# Patient Record
(demographics unavailable — no encounter records)

---

## 2025-03-20 NOTE — HEALTH RISK ASSESSMENT
[No] : In the past 12 months have you used drugs other than those required for medical reasons? No [0] : 2) Feeling down, depressed, or hopeless: Not at all (0) [PHQ-2 Negative - No further assessment needed] : PHQ-2 Negative - No further assessment needed [Never] : Never [Audit-CScore] : 0 [[id number]] : 0

## 2025-03-20 NOTE — HISTORY OF PRESENT ILLNESS
[FreeTextEntry1] : el tsh, paresthesias, chest tightness, sz hx [de-identified] : 63 y/o female with hx sz from the flu vaccine paresthesias, chest tightness, el TSH here for f/u Has been having lt anterior cervical swelling with tenderness and occ pain.  no fevers.  + occ sore throat.  no URI sx.  Neg covid testing. sono: Mildly enlarged and heterogeneous thyroid gland. There are no suspicious lesions.  Heterogeneity of the left submandibular gland suggestive of sialadenitis.  Still has been bothering her.    Has seen ENT - was sent to laryngologist. Has been doing voice training.  Has been okay.  to f/u. Has seen the endocrinologist. Has been taking the thyroid rx. Sono with no nodules, mild enlargement.  Had been taking the synthroid.  Currently not taking - lost the refill.  Has been having some foot and leg pain at the end of the day. Has been persisting. Interested in trying compression. Improved. Still some numbness at the foot. Had appt with neurology in Oct.  Has been okay.   Chest tightness has been better. had seen cardiology.  no sx recurrence.  Has been exercising.  With occ palpitations.  no triggers.  No noted relieving or exacerbating factors.  No sx with exercise.   Headaches have been improved.  Has been having rt thumb and lt shoulder pain for months.    Vaccines - tet < 10 flu - had sz when getting. permanently deferred.  Had covid vaccine x2.  gyn - 12/22 mammo - 1/24 - to get result sent colon - '18 - told to f/u in 10

## 2025-03-20 NOTE — HISTORY OF PRESENT ILLNESS
[FreeTextEntry1] : el tsh, paresthesias, chest tightness, sz hx [de-identified] : 63 y/o female with hx sz from the flu vaccine paresthesias, chest tightness, el TSH here for f/u Has been having lt anterior cervical swelling with tenderness and occ pain.  no fevers.  + occ sore throat.  no URI sx.  Neg covid testing. sono: Mildly enlarged and heterogeneous thyroid gland. There are no suspicious lesions.  Heterogeneity of the left submandibular gland suggestive of sialadenitis.  Still has been bothering her.    Has seen ENT - was sent to laryngologist. Has been doing voice training.  Has been okay.  to f/u. Has seen the endocrinologist. Has been taking the thyroid rx. Sono with no nodules, mild enlargement.  Had been taking the synthroid.  Currently not taking - lost the refill.  Has been having some foot and leg pain at the end of the day. Has been persisting. Interested in trying compression. Improved. Still some numbness at the foot. Had appt with neurology in Oct.  Has been okay.   Chest tightness has been better. had seen cardiology.  no sx recurrence.  Has been exercising.  With occ palpitations.  no triggers.  No noted relieving or exacerbating factors.  No sx with exercise.   Headaches have been improved.  Has been having rt thumb and lt shoulder pain for months.    Vaccines - tet < 10 flu - had sz when getting. permanently deferred.  Had covid vaccine x2.  gyn - 12/22 mammo - 1/24 - to get result sent colon - '18 - told to f/u in 10

## 2025-03-20 NOTE — PHYSICAL EXAM
[No Acute Distress] : no acute distress [Well Nourished] : well nourished [Well Developed] : well developed [Well-Appearing] : well-appearing [Normal Voice/Communication] : normal voice/communication [Normal Sclera/Conjunctiva] : normal sclera/conjunctiva [PERRL] : pupils equal round and reactive to light [EOMI] : extraocular movements intact [Normal Outer Ear/Nose] : the outer ears and nose were normal in appearance [Normal Oropharynx] : the oropharynx was normal [No JVD] : no jugular venous distention [Supple] : supple [No Respiratory Distress] : no respiratory distress  [No Accessory Muscle Use] : no accessory muscle use [Clear to Auscultation] : lungs were clear to auscultation bilaterally [Normal Rate] : normal rate  [Regular Rhythm] : with a regular rhythm [Normal S1, S2] : normal S1 and S2 [No Murmur] : no murmur heard [No Carotid Bruits] : no carotid bruits [No Abdominal Bruit] : a ~M bruit was not heard ~T in the abdomen [Pedal Pulses Present] : the pedal pulses are present [No Edema] : there was no peripheral edema [No Palpable Aorta] : no palpable aorta [Soft] : abdomen soft [Non Tender] : non-tender [Non-distended] : non-distended [No Masses] : no abdominal mass palpated [No HSM] : no HSM [Normal Bowel Sounds] : normal bowel sounds [Normal Posterior Cervical Nodes] : no posterior cervical lymphadenopathy [No CVA Tenderness] : no CVA  tenderness [No Spinal Tenderness] : no spinal tenderness [No Joint Swelling] : no joint swelling [Grossly Normal Strength/Tone] : grossly normal strength/tone [Coordination Grossly Intact] : coordination grossly intact [No Focal Deficits] : no focal deficits [Normal Gait] : normal gait [Speech Grossly Normal] : speech grossly normal [Memory Grossly Normal] : memory grossly normal [Normal Affect] : the affect was normal [Alert and Oriented x3] : oriented to person, place, and time [Normal Mood] : the mood was normal [Normal Insight/Judgement] : insight and judgment were intact [No Lymphadenopathy] : no lymphadenopathy [Normal Anterior Cervical Nodes] : no anterior cervical lymphadenopathy [de-identified] : mild tenderness at the thenar eminence.  + some pain when stretching the thumb awafrom palmar abduction.  FROM. No tenderness and FROM at the shoulder.  No pain pushing against resistance.  No pain with internal/external rotation against resistance.

## 2025-03-20 NOTE — REVIEW OF SYSTEMS
[Fatigue] : fatigue [Hot Flashes] : hot flashes [Night Sweats] : night sweats [Earache] : earache [Nasal Discharge] : nasal discharge [Muscle Pain] : muscle pain [Negative] : Heme/Lymph [Chest Pain] : no chest pain [Palpitations] : no palpitations [Leg Claudication] : no leg claudication [Lower Ext Edema] : no lower extremity edema [Orthopnea] : no orthopnea [Paroxysmal Nocturnal Dyspnea] : no paroxysmal nocturnal dyspnea [Joint Pain] : no joint pain [Joint Stiffness] : no joint stiffness [Joint Swelling] : no joint swelling [Muscle Weakness] : no muscle weakness [Back Pain] : no back pain [FreeTextEntry4] : per HPI [FreeTextEntry9] : body aches

## 2025-03-20 NOTE — PHYSICAL EXAM
[No Acute Distress] : no acute distress [Well Nourished] : well nourished [Well Developed] : well developed [Well-Appearing] : well-appearing [Normal Voice/Communication] : normal voice/communication [Normal Sclera/Conjunctiva] : normal sclera/conjunctiva [PERRL] : pupils equal round and reactive to light [EOMI] : extraocular movements intact [Normal Outer Ear/Nose] : the outer ears and nose were normal in appearance [Normal Oropharynx] : the oropharynx was normal [No JVD] : no jugular venous distention [Supple] : supple [No Respiratory Distress] : no respiratory distress  [No Accessory Muscle Use] : no accessory muscle use [Clear to Auscultation] : lungs were clear to auscultation bilaterally [Normal Rate] : normal rate  [Regular Rhythm] : with a regular rhythm [Normal S1, S2] : normal S1 and S2 [No Murmur] : no murmur heard [No Carotid Bruits] : no carotid bruits [No Abdominal Bruit] : a ~M bruit was not heard ~T in the abdomen [Pedal Pulses Present] : the pedal pulses are present [No Edema] : there was no peripheral edema [No Palpable Aorta] : no palpable aorta [Soft] : abdomen soft [Non Tender] : non-tender [Non-distended] : non-distended [No Masses] : no abdominal mass palpated [No HSM] : no HSM [Normal Bowel Sounds] : normal bowel sounds [Normal Posterior Cervical Nodes] : no posterior cervical lymphadenopathy [No CVA Tenderness] : no CVA  tenderness [No Spinal Tenderness] : no spinal tenderness [No Joint Swelling] : no joint swelling [Grossly Normal Strength/Tone] : grossly normal strength/tone [Coordination Grossly Intact] : coordination grossly intact [No Focal Deficits] : no focal deficits [Normal Gait] : normal gait [Speech Grossly Normal] : speech grossly normal [Memory Grossly Normal] : memory grossly normal [Normal Affect] : the affect was normal [Alert and Oriented x3] : oriented to person, place, and time [Normal Mood] : the mood was normal [Normal Insight/Judgement] : insight and judgment were intact [No Lymphadenopathy] : no lymphadenopathy [Normal Anterior Cervical Nodes] : no anterior cervical lymphadenopathy [de-identified] : mild tenderness at the thenar eminence.  + some pain when stretching the thumb awafrom palmar abduction.  FROM. No tenderness and FROM at the shoulder.  No pain pushing against resistance.  No pain with internal/external rotation against resistance.

## 2025-03-20 NOTE — HEALTH RISK ASSESSMENT
[No] : In the past 12 months have you used drugs other than those required for medical reasons? No [0] : 2) Feeling down, depressed, or hopeless: Not at all (0) [PHQ-2 Negative - No further assessment needed] : PHQ-2 Negative - No further assessment needed [Never] : Never [Audit-CScore] : 0 [[id number]] : 0

## 2025-03-28 NOTE — END OF VISIT
[FreeTextEntry3] : I, Dr. Vidal personally performed the evaluation and management (E/M) services , including all procedures, for this established patient who presents today with (a) new problem(s)/exacerbation of (an) existing condition(s). That E/M includes conducting the clinically appropriate interval history &/or exam, assessing all new/exacerbated conditions, and establishing a new plan of care. Today, my UNIQUE, Sintia Duran, was here to observe &/or participate in the visit & follow plan of care established by me.

## 2025-03-28 NOTE — HISTORY OF PRESENT ILLNESS
[de-identified] : Patient was seen about 2 years ago for the same issues: again feeling like when singing or speaking she has pain in the right side of the neck and throat. The pain feels like a tightness. Makes it hard to sing  She is not having any issues with eating, drinking or swallowing  She was seen about 2 years ago with the same complaint and she was found to have a sluggish left vocal cord. She was referred to Dr Houser who saw nothing at the time and put her on reflux meds. She took this but cannot recall at that time if it helped  No nasal issues or ear issues

## 2025-03-28 NOTE — ASSESSMENT
[FreeTextEntry1] : Patient follows up has similar symptoms that she did in the first time we saw her 2 years ago where she had a vocal cord paresis that apparently got better on its own she was treated with reflux at the time and placed on nasal sprays once again she is noticing similar symptoms again fiberoptically there is evidence of reflux in her arytenoids and a little bit of paresis of her left vocal cord once again put her on a Medrol dose pack this time and told her to restart her reflux medication and follow-up and see us in 2 months.

## 2025-03-28 NOTE — REASON FOR VISIT
[Subsequent Evaluation] : a subsequent evaluation for [FreeTextEntry2] :  hoarseness and throat issues

## 2025-06-19 NOTE — ASSESSMENT
[FreeTextEntry1] : Patient follows up voice improved fiberoptic evaluation the larynx shows vocal cord mobile mobile motility has improved both cords are moving she will continue on her reflux medication continue on Flonase follow-up and see us in 3 months.

## 2025-06-19 NOTE — END OF VISIT
[FreeTextEntry3] : I personally saw and examined VELVET SOUSA in detail this visit today. I personally reviewed the HPI, PMH, FH, SH, ROS, and medications/allergies. I have spoken to TEJINDER Patel regarding the history and have personally determined the assessment and plan of care and documented this myself. I was present and participated in all key portions of the encounter and all procedures noted above. I have made changes in the body of the note where appropriate.

## 2025-06-19 NOTE — HISTORY OF PRESENT ILLNESS
[de-identified] : Patient seen in March with recurrent hoarseness. She was found to have reflux and vocal cord paresis. She was given some reflux meds, nasal spray and a steroid pack and now reports improvement. She has stayed on the reflux meds since March. She does not have any pain in the throat, issues eating drinking or swallowing

## 2025-07-02 NOTE — PHYSICAL EXAM
[No Acute Distress] : no acute distress [Well Nourished] : well nourished [Well Developed] : well developed [Well-Appearing] : well-appearing [Normal Voice/Communication] : normal voice/communication [Normal Sclera/Conjunctiva] : normal sclera/conjunctiva [PERRL] : pupils equal round and reactive to light [EOMI] : extraocular movements intact [Normal Outer Ear/Nose] : the outer ears and nose were normal in appearance [Normal Oropharynx] : the oropharynx was normal [No JVD] : no jugular venous distention [No Lymphadenopathy] : no lymphadenopathy [Supple] : supple [No Respiratory Distress] : no respiratory distress  [No Accessory Muscle Use] : no accessory muscle use [Clear to Auscultation] : lungs were clear to auscultation bilaterally [Normal Rate] : normal rate  [Regular Rhythm] : with a regular rhythm [Normal S1, S2] : normal S1 and S2 [No Murmur] : no murmur heard [No Carotid Bruits] : no carotid bruits [No Abdominal Bruit] : a ~M bruit was not heard ~T in the abdomen [Pedal Pulses Present] : the pedal pulses are present [No Edema] : there was no peripheral edema [No Palpable Aorta] : no palpable aorta [Soft] : abdomen soft [Non Tender] : non-tender [Non-distended] : non-distended [No Masses] : no abdominal mass palpated [No HSM] : no HSM [Normal Bowel Sounds] : normal bowel sounds [Normal Posterior Cervical Nodes] : no posterior cervical lymphadenopathy [Normal Anterior Cervical Nodes] : no anterior cervical lymphadenopathy [No CVA Tenderness] : no CVA  tenderness [No Spinal Tenderness] : no spinal tenderness [No Joint Swelling] : no joint swelling [Grossly Normal Strength/Tone] : grossly normal strength/tone [Coordination Grossly Intact] : coordination grossly intact [No Focal Deficits] : no focal deficits [Normal Gait] : normal gait [Speech Grossly Normal] : speech grossly normal [Memory Grossly Normal] : memory grossly normal [Normal Affect] : the affect was normal [Alert and Oriented x3] : oriented to person, place, and time [Normal Mood] : the mood was normal [Normal Insight/Judgement] : insight and judgment were intact

## 2025-07-02 NOTE — HISTORY OF PRESENT ILLNESS
[FreeTextEntry1] : el tsh, paresthesias, chest tightness, sz hx, gerd [de-identified] : 62 y/o female with hx sz from the flu vaccine paresthesias, chest tightness, el TSH here for f/u Has been having lt anterior cervical swelling with tenderness and occ pain.  no fevers.  + occ sore throat.  no URI sx.  Neg covid testing. sono: Mildly enlarged and heterogeneous thyroid gland. There are no suspicious lesions.  Heterogeneity of the left submandibular gland suggestive of sialadenitis.  Not bothering her.    Has seen ENT - was sent to laryngologist. Has been doing voice training.  Had been okay.  Symptoms were bothering her again.  She saw ENT for follow-up.  Given Medrol pack, Flonase, PPI for sluggish vocal cord and evidence of GERD.  Had improvement of the symptoms on follow-up with ENT.  Patient continues on the PPI. Has seen the endocrinologist. Has been taking the thyroid rx. Sono with no nodules, mild enlargement.  Had been taking the synthroid.  Currently not taking. Has been having some foot and leg pain at the end of the day. Has been persisting. Interested in trying compression. Improved. Still some numbness at the foot. Had appt with neurology in Oct.  Has been okay.   Chest tightness has been better. had seen cardiology.  no sx recurrence.  Has been exercising.  With occ palpitations.  no triggers.  No noted relieving or exacerbating factors.  No sx with exercise.  Had called to establish with cardiology - ? wrong #.  Has been better.   Headaches have been improved.  Has been having rt thumb and lt shoulder pain.  Has been better. Has been having hemorrhoidal flare.  Some improvement with OTC rx.    Has retired.  Vaccines - tet < 10 flu - had sz when getting. permanently deferred.  Had covid vaccine x2.  gyn - Has followed up mammo - 4/25 colon - '18 - told to f/u in 10

## 2025-07-02 NOTE — HEALTH RISK ASSESSMENT
[No] : In the past 12 months have you used drugs other than those required for medical reasons? No [0] : 2) Feeling down, depressed, or hopeless: Not at all (0) [PHQ-2 Negative - No further assessment needed] : PHQ-2 Negative - No further assessment needed [Never] : Never [Patient reported mammogram was normal] : Patient reported mammogram was normal [MammogramDate] : 4/25 [Audit-CScore] : 0 [[id number]] : 0

## 2025-07-21 NOTE — REVIEW OF SYSTEMS
[Hot Flashes] : hot flashes [Muscle Pain] : muscle pain [Negative] : Heme/Lymph [Sore Throat] : sore throat [Cough] : cough [Dizziness] : dizziness

## 2025-07-21 NOTE — PHYSICAL EXAM
[No Acute Distress] : no acute distress [Well Nourished] : well nourished [Well Developed] : well developed [Well-Appearing] : well-appearing [Normal Voice/Communication] : normal voice/communication [Normal Sclera/Conjunctiva] : normal sclera/conjunctiva [PERRL] : pupils equal round and reactive to light [EOMI] : extraocular movements intact [Normal Outer Ear/Nose] : the outer ears and nose were normal in appearance [Normal Oropharynx] : the oropharynx was normal [No JVD] : no jugular venous distention [No Lymphadenopathy] : no lymphadenopathy [Supple] : supple [No Respiratory Distress] : no respiratory distress  [No Accessory Muscle Use] : no accessory muscle use [Normal Rate] : normal rate  [Regular Rhythm] : with a regular rhythm [Normal S1, S2] : normal S1 and S2 [No Murmur] : no murmur heard [No Carotid Bruits] : no carotid bruits [No Abdominal Bruit] : a ~M bruit was not heard ~T in the abdomen [Pedal Pulses Present] : the pedal pulses are present [No Edema] : there was no peripheral edema [No Palpable Aorta] : no palpable aorta [Soft] : abdomen soft [Non Tender] : non-tender [Non-distended] : non-distended [No Masses] : no abdominal mass palpated [No HSM] : no HSM [Normal Bowel Sounds] : normal bowel sounds [Normal Posterior Cervical Nodes] : no posterior cervical lymphadenopathy [Normal Anterior Cervical Nodes] : no anterior cervical lymphadenopathy [No CVA Tenderness] : no CVA  tenderness [No Spinal Tenderness] : no spinal tenderness [No Joint Swelling] : no joint swelling [Grossly Normal Strength/Tone] : grossly normal strength/tone [Coordination Grossly Intact] : coordination grossly intact [No Focal Deficits] : no focal deficits [Normal Gait] : normal gait [Speech Grossly Normal] : speech grossly normal [Memory Grossly Normal] : memory grossly normal [Normal Affect] : the affect was normal [Alert and Oriented x3] : oriented to person, place, and time [Normal Mood] : the mood was normal [Normal Insight/Judgement] : insight and judgment were intact [Normal Percussion] : the chest was normal to percussion